# Patient Record
Sex: FEMALE | Race: WHITE | NOT HISPANIC OR LATINO | Employment: PART TIME | URBAN - METROPOLITAN AREA
[De-identification: names, ages, dates, MRNs, and addresses within clinical notes are randomized per-mention and may not be internally consistent; named-entity substitution may affect disease eponyms.]

---
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
## 2021-05-16 ENCOUNTER — OFFICE VISIT (OUTPATIENT)
Dept: URGENT CARE | Facility: PHYSICIAN GROUP | Age: 21
End: 2021-05-16
Payer: COMMERCIAL

## 2021-05-16 VITALS
BODY MASS INDEX: 40.32 KG/M2 | HEIGHT: 65 IN | SYSTOLIC BLOOD PRESSURE: 116 MMHG | WEIGHT: 242 LBS | DIASTOLIC BLOOD PRESSURE: 60 MMHG | HEART RATE: 89 BPM | OXYGEN SATURATION: 100 % | TEMPERATURE: 97.9 F | RESPIRATION RATE: 20 BRPM

## 2021-05-16 DIAGNOSIS — R21 RASH: ICD-10-CM

## 2021-05-16 PROCEDURE — 99203 OFFICE O/P NEW LOW 30 MIN: CPT | Performed by: PHYSICIAN ASSISTANT

## 2021-05-16 RX ORDER — NORGESTIMATE AND ETHINYL ESTRADIOL 7DAYSX3 LO
KIT ORAL
COMMUNITY
End: 2022-05-11

## 2021-05-16 RX ORDER — METHYLPREDNISOLONE 4 MG/1
TABLET ORAL
Qty: 21 TABLET | Refills: 0 | Status: SHIPPED | OUTPATIENT
Start: 2021-05-16 | End: 2022-05-11

## 2021-05-16 RX ORDER — LEVOTHYROXINE SODIUM 137 UG/1
137 TABLET ORAL
COMMUNITY
End: 2022-04-17

## 2021-05-16 ASSESSMENT — ENCOUNTER SYMPTOMS
NEUROLOGICAL NEGATIVE: 1
RESPIRATORY NEGATIVE: 1
CARDIOVASCULAR NEGATIVE: 1
GASTROINTESTINAL NEGATIVE: 1
CONSTITUTIONAL NEGATIVE: 1

## 2021-05-16 NOTE — PROGRESS NOTES
"Subjective:   Delfina Mancilla is a 21 y.o. female who presents for Rash (warm and itchy rash on torso face and arms x 6 days)      HPI  Patient presents the clinic with complaints of a rash onset about 1 week ago.  She states the rash started to her right jaw/neck and gradually spread to her chest, torso, and back.  The rash to her right jaw/neck is resolving.  The rash has a dull itch but no excessive itching.  Denies any excessive sun exposure.  During time of onset she did have a haircut had new products in her hair.  In addition, she wore 2 new shirts that were not prior washed.  Denies any new medications.  She denies any fever, chills, cough, runny nose, chest pain, shortness of breath, vomiting, diarrhea.    Review of Systems   Constitutional: Negative.    HENT: Negative.    Respiratory: Negative.    Cardiovascular: Negative.    Gastrointestinal: Negative.    Skin: Positive for itching and rash.   Neurological: Negative.        Medications:    • levothyroxine Tabs  • Norgestim-Eth Estrad Triphasic Tabs    Allergies: Patient has no known allergies.    Problem List: Delfina Mancilla does not have a problem list on file.    Surgical History:  No past surgical history on file.    Past Social Hx: Delfina Mancilla  reports that she has never smoked. She has never used smokeless tobacco.     Past Family Hx:  Delfina Mancilla family history is not on file.     Problem list, medications, and allergies reviewed by myself today in Epic.     Objective:     /60 (BP Location: Left arm, Patient Position: Sitting, BP Cuff Size: Adult)   Pulse 89   Temp 36.6 °C (97.9 °F) (Temporal)   Resp 20   Ht 1.651 m (5' 5\")   Wt 110 kg (242 lb)   SpO2 100%   BMI 40.27 kg/m²     Physical Exam  Vitals reviewed.   Constitutional:       General: She is not in acute distress.     Appearance: Normal appearance. She is not ill-appearing or toxic-appearing.   HENT:      Mouth/Throat:      Lips: No lesions.      Mouth: Mucous membranes are " moist. No angioedema.      Tongue: No lesions.      Palate: No lesions.      Pharynx: Oropharynx is clear. No oropharyngeal exudate or posterior oropharyngeal erythema.   Eyes:      Conjunctiva/sclera: Conjunctivae normal.      Pupils: Pupils are equal, round, and reactive to light.   Cardiovascular:      Rate and Rhythm: Normal rate and regular rhythm.      Heart sounds: Normal heart sounds.   Pulmonary:      Effort: Pulmonary effort is normal. No respiratory distress.      Breath sounds: Normal breath sounds. No wheezing, rhonchi or rales.   Musculoskeletal:      Cervical back: Neck supple.   Lymphadenopathy:      Cervical: No cervical adenopathy.   Skin:     General: Skin is warm and dry.      Comments: Diffuse generalized uniform pink to erythematous papular rash to chest, back, and mildly on torso. Seems to be somewhat in kristina tree pattern to back. No obvious herald patch.  Negative signs of cellulitis.  Negative drainage, pustules or vesicles.  Negative excoriations or burrows.   Neurological:      General: No focal deficit present.      Mental Status: She is alert and oriented to person, place, and time.   Psychiatric:         Mood and Affect: Mood normal.         Behavior: Behavior normal.         Assessment/Associated Orders     1. Rash  methylPREDNISolone (MEDROL DOSEPAK) 4 MG Tablet Therapy Pack       Medical Decision Making      This is a well-appearing 21-year-old female in no acute distress presents with a generalized diffuse rash to her chest, torso, and back onset about 1 week ago.  See full history above.  Possible delayed allergic reaction or irritant reaction from new hair product insurance versus viral exanthem or pityriasis rosacea.    Plan:   Medrol Dosepak.  Take in the morning.  Avoid other NSAIDs while taking this medication.  Discussed side effects.  Benadryl x1 tonight  Nonsedating oral antihistamine such as Zyrtec or Claritin in the morning.   Fragrance free hydration lotion  especially after showers.  Clean sheets enclosed.    I personally reviewed prior external notes and test results pertinent to today's visit. Supportive care, natural history, differential diagnoses, and indications for immediate follow-up discussed. Patient expresses understanding and agrees to plan. Patient denies any other questions or concerns.     Advised the patient to follow-up with the primary care physician for recheck, reevaluation, and consideration of further management.    Please note that this dictation was created using voice recognition software. I have made a reasonable attempt to correct obvious errors, but I expect that there are errors of grammar and possibly content that I did not discover before finalizing the note.    This note was electronically signed by Jose Pruitt PA-C

## 2022-04-17 ENCOUNTER — OFFICE VISIT (OUTPATIENT)
Dept: URGENT CARE | Facility: PHYSICIAN GROUP | Age: 22
End: 2022-04-17
Payer: COMMERCIAL

## 2022-04-17 VITALS
BODY MASS INDEX: 41.15 KG/M2 | HEART RATE: 86 BPM | TEMPERATURE: 97.1 F | DIASTOLIC BLOOD PRESSURE: 84 MMHG | WEIGHT: 247 LBS | RESPIRATION RATE: 18 BRPM | OXYGEN SATURATION: 99 % | SYSTOLIC BLOOD PRESSURE: 128 MMHG | HEIGHT: 65 IN

## 2022-04-17 DIAGNOSIS — H66.001 ACUTE SUPPURATIVE OTITIS MEDIA OF RIGHT EAR WITHOUT SPONTANEOUS RUPTURE OF TYMPANIC MEMBRANE, RECURRENCE NOT SPECIFIED: ICD-10-CM

## 2022-04-17 PROBLEM — E03.9 HYPOTHYROIDISM: Status: ACTIVE | Noted: 2019-12-28

## 2022-04-17 PROBLEM — Z30.09 GENERAL COUNSELING AND ADVICE ON CONTRACEPTIVE MANAGEMENT: Status: ACTIVE | Noted: 2019-12-19

## 2022-04-17 PROBLEM — E06.3 HASHIMOTO'S THYROIDITIS: Status: ACTIVE | Noted: 2022-04-17

## 2022-04-17 PROBLEM — Z88.9 ALLERGIC CONDITION: Status: ACTIVE | Noted: 2022-04-17

## 2022-04-17 PROBLEM — E55.9 VITAMIN D DEFICIENCY: Status: ACTIVE | Noted: 2022-04-17

## 2022-04-17 PROBLEM — E78.5 HYPERLIPIDEMIA: Status: ACTIVE | Noted: 2022-04-17

## 2022-04-17 PROBLEM — Z91.02: Status: ACTIVE | Noted: 2022-04-17

## 2022-04-17 PROCEDURE — 99213 OFFICE O/P EST LOW 20 MIN: CPT | Performed by: FAMILY MEDICINE

## 2022-04-17 RX ORDER — LEVOTHYROXINE SODIUM 150 MCG
TABLET ORAL
COMMUNITY
Start: 2022-04-06

## 2022-04-17 RX ORDER — AMOXICILLIN 875 MG/1
875 TABLET, COATED ORAL 2 TIMES DAILY
Qty: 14 TABLET | Refills: 0 | Status: SHIPPED | OUTPATIENT
Start: 2022-04-17 | End: 2022-04-24

## 2022-04-17 ASSESSMENT — ENCOUNTER SYMPTOMS
EYE DISCHARGE: 0
VOMITING: 0
WEIGHT LOSS: 0
EYE REDNESS: 0
MYALGIAS: 0
NAUSEA: 0

## 2022-05-04 ENCOUNTER — OFFICE VISIT (OUTPATIENT)
Dept: URGENT CARE | Facility: PHYSICIAN GROUP | Age: 22
End: 2022-05-04
Payer: COMMERCIAL

## 2022-05-04 VITALS
WEIGHT: 239.6 LBS | BODY MASS INDEX: 39.92 KG/M2 | HEIGHT: 65 IN | HEART RATE: 130 BPM | TEMPERATURE: 97.3 F | SYSTOLIC BLOOD PRESSURE: 110 MMHG | OXYGEN SATURATION: 97 % | DIASTOLIC BLOOD PRESSURE: 58 MMHG | RESPIRATION RATE: 18 BRPM

## 2022-05-04 DIAGNOSIS — H61.21 CERUMEN DEBRIS ON TYMPANIC MEMBRANE OF RIGHT EAR: ICD-10-CM

## 2022-05-04 DIAGNOSIS — R00.0 SINUS TACHYCARDIA: ICD-10-CM

## 2022-05-04 DIAGNOSIS — H65.01 NON-RECURRENT ACUTE SEROUS OTITIS MEDIA OF RIGHT EAR: ICD-10-CM

## 2022-05-04 DIAGNOSIS — T36.95XA ANTIBIOTIC-INDUCED YEAST INFECTION: ICD-10-CM

## 2022-05-04 DIAGNOSIS — B37.9 ANTIBIOTIC-INDUCED YEAST INFECTION: ICD-10-CM

## 2022-05-04 PROCEDURE — 99214 OFFICE O/P EST MOD 30 MIN: CPT | Performed by: STUDENT IN AN ORGANIZED HEALTH CARE EDUCATION/TRAINING PROGRAM

## 2022-05-04 RX ORDER — FLUCONAZOLE 150 MG/1
TABLET ORAL
Qty: 2 TABLET | Refills: 0 | Status: SHIPPED | OUTPATIENT
Start: 2022-05-04 | End: 2022-05-11

## 2022-05-04 RX ORDER — AMOXICILLIN AND CLAVULANATE POTASSIUM 875; 125 MG/1; MG/1
1 TABLET, FILM COATED ORAL 2 TIMES DAILY
Qty: 10 TABLET | Refills: 0 | Status: SHIPPED | OUTPATIENT
Start: 2022-05-04 | End: 2022-05-09

## 2022-05-04 NOTE — PROGRESS NOTES
Subjective:   CHIEF COMPLAINT  Chief Complaint   Patient presents with   • Ear Pain     Ear pain and pressure not going away since last visit, cough started 2days ago, worse at night, runny nose, itchy throat       HPI  Delfina Mancilla is a 21 y.o. female who presents with a chief complaint of right ear congestion.  She was seen in urgent care approximately 2 weeks ago and diagnosed with an acute otitis media.  She was treated with amoxicillin.  Said the pain resolved however she has been experiencing congestion for the last 5 days.  There has been no drainage from her ear.  She has been using Zyrtec-D and Flonase which have not provided relief.  She denies experiencing any chest pain or palpitations.    REVIEW OF SYSTEMS  General: no fever or chills  GI: no nausea or vomiting  See HPI for further details.    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 (Physician) on 05/04/22 at 1252  Med List Status: <None>   Medication Last Dose Status   Fexofenadine-Pseudoephedrine (ALLEGRA-D PO) Taking Active   methylPREDNISolone (MEDROL DOSEPAK) 4 MG Tablet Therapy Pack  Active   Norgestim-Eth Estrad Triphasic 0.18/0.215/0.25 MG-25 MCG Tab Not Taking Active   SYNTHROID 150 MCG Tab Taking Active                SOCIAL HISTORY  Social History     Tobacco Use   • Smoking status: Never Smoker   • Smokeless tobacco: Never Used   Vaping Use   • Vaping Use: Never used   Substance and Sexual Activity   • Alcohol use: Yes     Comment: occ   • Drug use: Not on  "file   • Sexual activity: Not on file       FAMILY HISTORY  No family history on file.       Objective:   PHYSICAL EXAM  VITAL SIGNS: /58 (BP Location: Left arm, Patient Position: Sitting, BP Cuff Size: Adult)   Pulse (!) 130   Temp 36.3 °C (97.3 °F) (Temporal)   Resp 18   Ht 1.651 m (5' 5\")   Wt 109 kg (239 lb 9.6 oz)   SpO2 97%   BMI 39.87 kg/m²     Gen: no acute distress, normal voice  Skin: dry, intact, moist mucosal membranes  ENT: Right cerumen impaction  Lungs: CTAB w/ symmetric expansion  CV: Sinus tachycardia w/o murmurs or clicks  Psych: normal affect, normal judgement, alert, awake    Assessment/Plan:     1. Cerumen debris on tympanic membrane of right ear     2. Non-recurrent acute serous otitis media of right ear  amoxicillin-clavulanate (AUGMENTIN) 875-125 MG Tab   3. Sinus tachycardia     4. Antibiotic-induced yeast infection  fluconazole (DIFLUCAN) 150 MG tablet   1) Right ear lavage was performed successfully removing cerumen.  Patient tolerated procedure well.  On repeat exam TM was erythematous with clear effusion, but intact.  Patient was seen in urgent care on 4/17/2022 and treated with amoxicillin for an acute otitis media.  She says the symptoms never fully resolved after completion of abx.  - Order Rx for Augmentin to provide broader spectrum of coverage  - Continue Flonase  - Begin Zyrtec (see below)    2)Asymptomatic.  Suspect secondary to the pseudoephedrine in Zyrtec-D.  - Instructed to take Zyrtec without the \"D\" component  - Follow-up with primary care  - If develop chest pain go to ED  -Return to urgent care any new/worsening symptoms or further questions or concerns.  Patient understood everything discussed.  All questions were answered.      Differential diagnosis, natural history, supportive care, and indications for immediate follow-up discussed. All questions answered. Patient agrees with the plan of care.    Follow-up as needed if symptoms worsen or fail to improve " to PCP, Urgent care or Emergency Room.    >1 new diagnosis, failure of treatment, side effect of previous treatment, uncertain prognosis, ordered prescriptions    Please note that this dictation was created using voice recognition software. I have made a reasonable attempt to correct obvious errors, but I expect that there are errors of grammar and possibly content that I did not discover before finalizing the note.

## 2022-05-11 ENCOUNTER — HOSPITAL ENCOUNTER (EMERGENCY)
Facility: MEDICAL CENTER | Age: 22
End: 2022-05-11
Attending: EMERGENCY MEDICINE
Payer: COMMERCIAL

## 2022-05-11 VITALS
DIASTOLIC BLOOD PRESSURE: 73 MMHG | OXYGEN SATURATION: 98 % | SYSTOLIC BLOOD PRESSURE: 118 MMHG | HEART RATE: 77 BPM | BODY MASS INDEX: 40.47 KG/M2 | TEMPERATURE: 97.8 F | WEIGHT: 243.17 LBS | RESPIRATION RATE: 16 BRPM

## 2022-05-11 DIAGNOSIS — L50.9 URTICARIA: ICD-10-CM

## 2022-05-11 PROCEDURE — 99282 EMERGENCY DEPT VISIT SF MDM: CPT

## 2022-05-11 RX ORDER — AMOXICILLIN AND CLAVULANATE POTASSIUM 875; 125 MG/1; MG/1
1 TABLET, FILM COATED ORAL 2 TIMES DAILY
COMMUNITY

## 2022-05-11 NOTE — ED PROVIDER NOTES
ED Provider Note    CHIEF COMPLAINT  Chief Complaint   Patient presents with   • Hives     To abd and upper legs        \[facility] [place] [facility]\""  Delfina Mancilla is a 22 y.o. female who presents with hives.  Patient was diagnosed with left otitis media last week.  She has been on antibiotics for about 4 days.  She started getting an intermittent rash 2 days after starting antibiotics.  Seemed worse yesterday.  Rash all over the body.  Itchy.  She takes Benadryl which has seemed to help a little bit but rash was worse today.  No difficulty breathing.  No vomiting diarrhea.  No swelling.  She has never had a reaction before.  Has not had a fever.  Her ear is no longer hurting.    REVIEW OF SYSTEMS  Pertinent negative: As per \[facility] [place] [facility]\""    PAST MEDICAL HISTORY  History reviewed. No pertinent past medical history.    SOCIAL HISTORY  Social History     Tobacco Use   • Smoking status: Never Smoker   • Smokeless tobacco: Never Used   Vaping Use   • Vaping Use: Never used   Substance Use Topics   • Alcohol use: Yes     Comment: occ       SURGICAL HISTORY  History reviewed. No pertinent surgical history.    ALLERGIES  Allergies   Allergen Reactions   • Sodium Clavulanate      hives       PHYSICAL EXAM  VITAL SIGNS: /71   Pulse 99   Temp 36.3 °C (97.4 °F) (Temporal)   Resp 18   Wt 110 kg (243 lb 2.7 oz)   LMP 05/04/2022 (Approximate)   SpO2 90%   BMI 40.47 kg/m²    Constitutional: Awake and alert. Nontoxic  HENT: No swelling of the lips, tongue or pharynx.  Bilateral tympanic membranes are normal.  Eyes: Grossly normal  Neck: Normal range of motion  Cardiovascular: Normal heart rate   Thorax & Lungs: No respiratory distress, lung fields clear throughout  Abdomen: Nontender  Skin: Diffuse mild urticarial rash  Extremities: Well perfused no edema  Psychiatric: Affect normal      COURSE & MEDICAL DECISION MAKING  Patient presents with urticaria.  This is mild.  No other symptoms.  She does not have otitis media.  She will discontinue  amoxicillin.  Per guideline we will start Zyrtec 10 mg twice daily.  May escalate as needed if no improvement.  If patient has worsening, difficulty breathing, swelling she will return to the ER for further treatment.  She should not take amoxicillin in the future.     FINAL IMPRESSION  1.  Urticaria  2.  Augmentin allergy      Disposition: home in good condition      This dictation was created using voice recognition software. The accuracy of the dictation is limited to the abilities of the software.  The nursing notes were reviewed and certain aspects of this information were incorporated into this note.      Electronically signed by: Wolfgang Colon M.D., 5/11/2022 8:20 AM

## 2022-05-11 NOTE — ED TRIAGE NOTES
Ambulates to triage  Chief Complaint   Patient presents with   • Hives     To abd and upper legs      Pt has been taking augmentin for an ear infection, she's taking amoxicillin with no problems in the past. Last dose was 2 days ago. Last took benadryl at 0700 this morning.